# Patient Record
Sex: FEMALE | Race: WHITE
[De-identification: names, ages, dates, MRNs, and addresses within clinical notes are randomized per-mention and may not be internally consistent; named-entity substitution may affect disease eponyms.]

---

## 2017-10-09 ENCOUNTER — HOSPITAL ENCOUNTER (OUTPATIENT)
Dept: HOSPITAL 47 - RADMAMWWP | Age: 47
Discharge: HOME | End: 2017-10-09
Payer: COMMERCIAL

## 2017-10-09 DIAGNOSIS — Z12.31: Primary | ICD-10-CM

## 2017-10-10 NOTE — MM
Reason for exam: screening  (asymptomatic).

Last mammogram was performed 1 year and 2 months ago.



History:

Patient is postmenopausal and is nulliparous.

Benign excisional biopsy of the right breast, 2013.



Physical Findings:

A clinical breast exam by your physician is recommended on an annual basis and 

results should be correlated with mammographic findings.



MG Screening Mammo w CAD

Bilateral CC and MLO view(s) were taken.

Prior study comparison: July 29, 2016, bilateral MG screening mammo w CAD.

The breast tissue is heterogeneously dense. This may lower the sensitivity of 

mammography.  There is no discrete abnormality.





ASSESSMENT: Negative, BI-RAD 1



RECOMMENDATION:

Routine screening mammogram of both breasts in 1 year.

## 2019-03-04 ENCOUNTER — HOSPITAL ENCOUNTER (OUTPATIENT)
Dept: HOSPITAL 47 - RADMAMWWP | Age: 49
End: 2019-03-04
Payer: COMMERCIAL

## 2019-03-04 DIAGNOSIS — Z12.31: Primary | ICD-10-CM

## 2019-03-04 PROCEDURE — 77067 SCR MAMMO BI INCL CAD: CPT

## 2019-03-05 NOTE — MM
Reason for exam: screening  (asymptomatic).

Last mammogram was performed 1 year and 5 months ago.



History:

Patient is postmenopausal and is nulliparous.

Benign excisional biopsy of the right breast, 2013.



Physical Findings:

A clinical breast exam by your physician is recommended on an annual basis and 

results should be correlated with mammographic findings.



MG Screening Mammo w CAD

Bilateral CC and MLO view(s) were taken.

Prior study comparison: October 9, 2017, bilateral MG screening mammo w CAD.  July 29, 2016, bilateral MG screening mammo w CAD.

The breast tissue is heterogeneously dense. This may lower the sensitivity of 

mammography.

Finding: There is a 6 mm circumscribed round mass in the lower inner quadrant, 

posterior position of the right breast.

New finding since October 9, 2017 and July 29, 2016.





ASSESSMENT: Incomplete: need additional imaging evaluation, BI-RAD 0



RECOMMENDATION:

Special view mammogram and ultrasound of the right breast.



[facility] will attempt to contact patient to return for supplemental 

views and ultrasound.

## 2019-03-15 ENCOUNTER — HOSPITAL ENCOUNTER (OUTPATIENT)
Dept: HOSPITAL 47 - RADMAMWWP | Age: 49
Discharge: HOME | End: 2019-03-15
Payer: COMMERCIAL

## 2019-03-15 DIAGNOSIS — R92.8: Primary | ICD-10-CM

## 2019-03-15 PROCEDURE — 77065 DX MAMMO INCL CAD UNI: CPT

## 2019-03-15 PROCEDURE — 77061 BREAST TOMOSYNTHESIS UNI: CPT

## 2019-03-18 NOTE — MM
Reason for exam: additional evaluation requested from abnormal screening.

Last mammogram was performed less than 1 month ago.



History:

Patient is postmenopausal and is nulliparous.

Benign excisional biopsy of the right breast, 2013.



Physical Findings:

Nurse did not find any significant physical abnormalities on exam.



MG 3D Work Up W/Cad RT

CC and MLO view(s) were taken of the right breast.

Prior study comparison: March 4, 2019, bilateral MG screening mammo w CAD.  

October 9, 2017, bilateral MG screening mammo w CAD.

The breast tissue is heterogeneously dense. This may lower the sensitivity of 

mammography.  Asymmetric breast tissue persists, nodule.

No significant new findings when compared with previous films.



These results were verbally communicated with the patient and result sheet given 

to the patient on 3/15/19.





ASSESSMENT: Incomplete: need additional imaging evaluation, BI-RAD 0



RECOMMENDATION:

Ultrasound of the right breast.

## 2019-03-18 NOTE — USB
Reason for exam: additional evaluation requested from abnormal screening.



History:

Patient is postmenopausal and is nulliparous.

Benign excisional biopsy of the right breast, 2013.



US Breast Workup Limited RT

Right limited breast ultrasound including focal area of concern, retroareolar and 

axilla demonstrates a 0.4 x 0.3 x 0.2cm oval, cystic lesion at 3 o'clock and a 0.4

x 0.5 x 0.4cm oval, cystic lesion at 4 o'clock.



These results were verbally communicated with the patient and result sheet given 

to the patient on 3/15/19.





ASSESSMENT: Benign, BI-RAD 2



RECOMMENDATION:

Return to routine screening mammogram schedule for both breasts.

## 2019-12-06 ENCOUNTER — HOSPITAL ENCOUNTER (OUTPATIENT)
Dept: HOSPITAL 47 - RADNMMAIN | Age: 49
Discharge: HOME | End: 2019-12-06
Attending: FAMILY MEDICINE
Payer: COMMERCIAL

## 2019-12-06 DIAGNOSIS — R93.7: Primary | ICD-10-CM

## 2019-12-06 PROCEDURE — 78315 BONE IMAGING 3 PHASE: CPT

## 2019-12-06 NOTE — NM
EXAMINATION TYPE: NM bone 3 phase

 

DATE OF EXAM: 12/6/2019

 

COMPARISON: NONE

 

HISTORY: Left foot pain and concern for osteomyelitis. Left foot infection.

 

Triple phase bone scintigraphy was performed following the injection of 26.3 mCi Tc 99m MDP.  Immedia
te images and 3 hours post injection images acquired.

 

FINDINGS: 

 

There is increased flow and blood pool to the left lower extremity. On delayed images there is focal 
uptake near the second metatarsal phalangeal joint and medial talus. More diffuse uptake of the midfo
ot and hindfoot relates arthropathy.

 

IMPRESSION: 

Abnormal exam. Focal uptake near the second metatarsal phalangeal joint is concerning for osteomyelit
is. Focal uptake is also seen of the medial talus the more likely represents arthropathy change. Didier
elate with physical exam and left foot radiographs.